# Patient Record
Sex: FEMALE | Race: WHITE | HISPANIC OR LATINO | URBAN - METROPOLITAN AREA
[De-identification: names, ages, dates, MRNs, and addresses within clinical notes are randomized per-mention and may not be internally consistent; named-entity substitution may affect disease eponyms.]

---
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
## 2025-07-16 NOTE — ED PROVIDER NOTE - SKIN AREA #1
Try the famotidine for acid reflux.  This would mean stopping the omeprazole at least temporarily.  If you notice after a week that your symptoms     Try to minimize eating within 3 hours of lying down.  Try elevating the head of the bed 4-6 inches with blocks.      The number to schedule with Dr. Matthews (otolaryngology) is [phone].  Dr. Wolf Matute is another ENT physician - he has expertise in cystic fibrosis; I am unsure whether he operates much on patients with cholesteatomas.  His scheduling number is [phone] and he has clinic in [place].    Follow up in 2-3 months.  
blanching erythema over face, bilateral arms/shoulder/legs, dry blistered areas over mid forehead